# Patient Record
Sex: FEMALE | Race: BLACK OR AFRICAN AMERICAN | Employment: FULL TIME | URBAN - METROPOLITAN AREA
[De-identification: names, ages, dates, MRNs, and addresses within clinical notes are randomized per-mention and may not be internally consistent; named-entity substitution may affect disease eponyms.]

---

## 2017-02-13 RX ORDER — MEFENAMIC ACID 250 MG/1
CAPSULE ORAL
Qty: 30 CAP | Refills: 0 | Status: SHIPPED | OUTPATIENT
Start: 2017-02-13

## 2017-02-13 NOTE — TELEPHONE ENCOUNTER
Patient advised of prescription sent by MD and recommendations to schedule her AE and mammogram due after 6/14/16 Patient verbalized understanding and will call back to schedule appointments.

## 2017-02-13 NOTE — TELEPHONE ENCOUNTER
42year old patient last seen in the office on 6/14/16 AE. Patient requesting refill on Ponstel.  Patient seen on 9/30/2016 for problem visit  Please advise

## 2017-06-19 ENCOUNTER — APPOINTMENT (OUTPATIENT)
Dept: MAMMOGRAPHY | Age: 42
End: 2017-06-19

## 2017-06-19 ENCOUNTER — OFFICE VISIT (OUTPATIENT)
Dept: OBGYN CLINIC | Age: 42
End: 2017-06-19

## 2017-06-19 VITALS
RESPIRATION RATE: 18 BRPM | WEIGHT: 241.4 LBS | SYSTOLIC BLOOD PRESSURE: 126 MMHG | BODY MASS INDEX: 37.89 KG/M2 | HEIGHT: 67 IN | DIASTOLIC BLOOD PRESSURE: 68 MMHG

## 2017-06-19 DIAGNOSIS — Z01.419 WELL WOMAN EXAM WITH ROUTINE GYNECOLOGICAL EXAM: Primary | ICD-10-CM

## 2017-06-19 DIAGNOSIS — Z12.31 ENCOUNTER FOR SCREENING MAMMOGRAM FOR MALIGNANT NEOPLASM OF BREAST: ICD-10-CM

## 2017-06-19 DIAGNOSIS — R87.618 PAP SMEAR ABNORMALITY OF CERVIX/HUMAN PAPILLOMAVIRUS (HPV) POSITIVE: ICD-10-CM

## 2017-06-19 DIAGNOSIS — Z11.51 SCREENING FOR HUMAN PAPILLOMAVIRUS: ICD-10-CM

## 2017-06-19 RX ORDER — CLOTRIMAZOLE AND BETAMETHASONE DIPROPIONATE 10; .64 MG/G; MG/G
CREAM TOPICAL
Refills: 0 | COMMUNITY
Start: 2017-06-06

## 2017-06-19 NOTE — PATIENT INSTRUCTIONS

## 2017-06-19 NOTE — MR AVS SNAPSHOT
Visit Information Date & Time Provider Department Dept. Phone Encounter #  
 6/19/2017  1:40 PM MD Karlo Houser [phone] [id number] Your Appointments 6/19/2017  1:40 PM  
ESTABLISHED PATIENT with MD Karlo Houser ([address]) Appt Note: Mammo + AE w/TP - [employer]  
 [address] [facility] [address] [facility] [phone]  
[facility] 31 [address] Upcoming Health Maintenance Date Due INFLUENZA AGE 9 TO ADULT 8/1/2017 PAP AKA CERVICAL CYTOLOGY 6/14/2019 Allergies as of 6/19/2017  Review Complete On: 6/19/2017 By: Alex Smith No Known Allergies Current Immunizations  Never Reviewed No immunizations on file. Not reviewed this visit Vitals BP Resp Height(growth percentile) Weight(growth percentile) LMP BMI  
 126/68 (BP 1 Location: Left arm, BP Patient Position: Sitting) 18 5' 7\" (1.702 m) 241 lb 6.4 oz (109.5 kg) 05/29/2017 (Within Days) 37.81 kg/m2 OB Status Smoking Status Having regular periods Never Smoker BMI and BSA Data Body Mass Index Body Surface Area  
 37.81 kg/m 2 2.28 m 2 Preferred Pharmacy Pharmacy Name Phone [id number] [place], [place] - [address] [address] [phone] Your Updated Medication List  
  
   
This list is accurate as of: 6/19/17  1:28 PM.  Always use your most recent med list.  
  
  
  
  
 clotrimazole-betamethasone topical cream  
Commonly known as:  LOTRISONE  
APPLY TO AFFECTED AREA TWICE A DAY HYDROcodone-acetaminophen 5-325 mg per tablet Commonly known as:  Rozann Aurelio Take 1 Tab by mouth every four (4) hours as needed for Pain. IBUPROFEN PO Take  by mouth. mefenamic acid 250 mg capsule Commonly known as:  PONSTEL Take 500mg po as a first dose then 250 mg q6hrs as needed for pain for 7 days.  
  
 MIDOL PO Take  by mouth. naproxen 375 mg tablet Commonly known as:  NAPROSYN Take 1 Tab by mouth two (2) times daily (with meals). Patient Instructions Well Visit, Ages 25 to 48: Care Instructions Your Care Instructions Physical exams can help you stay healthy. Your doctor has checked your overall health and may have suggested ways to take good care of yourself. He or she also may have recommended tests. At home, you can help prevent illness with healthy eating, regular exercise, and other steps. Follow-up care is a key part of your treatment and safety. Be sure to make and go to all appointments, and call your doctor if you are having problems. It's also a good idea to know your test results and keep a list of the medicines you take. How can you care for yourself at home? · Reach and stay at a healthy weight. This will lower your risk for many problems, such as obesity, diabetes, heart disease, and high blood pressure. · Get at least 30 minutes of physical activity on most days of the week. Walking is a good choice. You also may want to do other activities, such as running, swimming, cycling, or playing tennis or team sports. Discuss any changes in your exercise program with your doctor. · Do not smoke or allow others to smoke around you. If you need help quitting, talk to your doctor about stop-smoking programs and medicines. These can increase your chances of quitting for good. · Talk to your doctor about whether you have any risk factors for sexually transmitted infections (STIs). Having one sex partner (who does not have STIs and does not have sex with anyone else) is a good way to avoid these infections. · Use birth control if you do not want to have children at this time. Talk with your doctor about the choices available and what might be best for you. · Protect your skin from too much sun. When you're outdoors from 10 a.m. to 4 p.m., stay in the shade or cover up with clothing and a hat with a wide brim. Wear sunglasses that block UV rays. Even when it's cloudy, put broad-spectrum sunscreen (SPF 30 or higher) on any exposed skin. · See a dentist one or two times a year for checkups and to have your teeth cleaned. · Wear a seat belt in the car. · Drink alcohol in moderation, if at all. That means no more than 2 drinks a day for men and 1 drink a day for women. Follow your doctor's advice about when to have certain tests. These tests can spot problems early. For everyone · Cholesterol. Have the fat (cholesterol) in your blood tested after age 21. Your doctor will tell you how often to have this done based on your age, family history, or other things that can increase your risk for heart disease. · Blood pressure. Have your blood pressure checked during a routine doctor visit. Your doctor will tell you how often to check your blood pressure based on your age, your blood pressure results, and other factors. · Vision. Talk with your doctor about how often to have a glaucoma test. 
· Diabetes. Ask your doctor whether you should have tests for diabetes. · Colon cancer. Have a test for colon cancer at age 48. You may have one of several tests. If you are younger than 48, you may need a test earlier if you have any risk factors. Risk factors include whether you already had a precancerous polyp removed from your colon or whether your parent, brother, sister, or child has had colon cancer. For women · Breast exam and mammogram. Talk to your doctor about when you should have a clinical breast exam and a mammogram. Medical experts differ on whether and how often women under 50 should have these tests. Your doctor can help you decide what is right for you. · Pap test and pelvic exam. Begin Pap tests at age 24. A Pap test is the best way to find cervical cancer.  The test often is part of a pelvic exam. Ask how often to have this test. 
 · Tests for sexually transmitted infections (STIs). Ask whether you should have tests for STIs. You may be at risk if you have sex with more than one person, especially if your partners do not wear condoms. For men · Tests for sexually transmitted infections (STIs). Ask whether you should have tests for STIs. You may be at risk if you have sex with more than one person, especially if you do not wear a condom. · Testicular cancer exam. Ask your doctor whether you should check your testicles regularly. · Prostate exam. Talk to your doctor about whether you should have a blood test (called a PSA test) for prostate cancer. Experts differ on whether and when men should have this test. Some experts suggest it if you are older than 39 and are -American or have a father or brother who got prostate cancer when he was younger than 72. When should you call for help? Watch closely for changes in your health, and be sure to contact your doctor if you have any problems or symptoms that concern you. Where can you learn more? Go to http://odalys-kandice.info/. Enter P072 in the search box to learn more about \"Well Visit, Ages 25 to 48: Care Instructions. \" Current as of: July 19, 2016 Content Version: 11.2 © 4459-3408 [employer], [employer]. Care instructions adapted under license by [employer] (which disclaims liability or warranty for this information). If you have questions about a medical condition or this instruction, always ask your healthcare professional. [place] [address] any warranty or liability for your use of this information. Introducing [facility]! Dear Shay Duong: Thank you for requesting a [employer] account. Our records indicate that you already have an active [employer] account. You can access your account anytime at [email or web address] Did you know that you can access your hospital and ER discharge instructions at any time in Sov[employer]? You can also review all of your test results from your hospital stay or ER visit. Additional Information If you have questions, please visit the Frequently Asked Questions section of the [employer] website at [email or web address] Remember, [employer] is NOT to be used for urgent needs. For medical emergencies, dial 911. Now available from your iPhone and Android! Please provide this summary of care documentation to your next provider. Your primary care clinician is listed as Lucretia Singh. If you have any questions after today's visit, please call [phone].

## 2017-06-19 NOTE — PROGRESS NOTES
[address] [facility]  [email or web address]  [phone]    Anita Romero MD, [address] [facility]       Annual Gynecologic Exam  WWE 52-38  Chief Complaint   Patient presents with    Well Woman       Lupillo Askew is a 42 y.o.  [address]  female who presents for an annual exam.  Patient's last menstrual period was 2017 (within days). .    She does not report additional concerns today. Menstrual status:  Her periods are moderate. She does not report dysmenorrhea/painful menses. She does not report irregular bleeding. Sexual history and Contraception:  History   Sexual Activity    Sexual activity: Yes    Partners: Male    Birth control/ protection: Condom     She always uses condoms with sexual activity. She does not reports new sexual partner(s) in the last year. The patient does not request STD testing. Preventive Medicine History:  Her last annual GYN exam was about one year ago. Her most recent Pap smear result: normal was obtained in 2016. Her most recent HR HPV screen was Positive obtained 1 year(s) ago. She does not know have a history of DAWN 2, 3 or cervical cancer. Breast health:  Last mammogram: was normal.   A mammogram was scheduled for today. Breast cancer family updated: see FH. Bone health: [place] [facility] She does not have a history of osteopenia/osteoporosis. Osteoporosis family history updated: see FH.      Past Medical History:   Diagnosis Date    Abnormal Pap smear of cervix 2016    negative, +HPV    Fibroids     H/O mammogram 2016    Negative    Migraine     Pap smear for cervical cancer screening 2010    negative, HPV negative     OB History    Para Term  AB SAB TAB Ectopic Multiple Living   3 3 1 2     1 4      # Outcome Date GA Lbr Davide/2nd Weight Sex Delivery Anes PTL Lv   3  11 32w2d  3 lb 14.4 oz (1.77 kg) F  LO GENERAL AN Y Y   2 Term 10/12/94   7 lb 15 oz (3.6 kg) M VACD EPI N Y   1A  92   3 lb 3 oz (1.446 kg) F  LO EPIDURAL AN Y Y   1B  92 33w0d  3 lb 9 oz (1.616 kg) F  LO EPI Y Y          Past Surgical History:   Procedure Laterality Date     DELIVERY ONLY      twins    DELIVERY   2011         HX COLPOSCOPY      HX GYN      C/S  for twins    HX GYN           HX OTHER SURGICAL      breast reduction     Family History   Problem Relation Age of Onset    No Known Problems Mother     Diabetes Father     Breast Cancer Maternal Aunt     Colon Cancer Other      great uncle    Breast Cancer Maternal Grandmother      Social History     Social History    Marital status:      Spouse name: N/A    Number of children: N/A    Years of education: N/A     Occupational History    Not on file. Social History Main Topics    Smoking status: Never Smoker    Smokeless tobacco: Never Used    Alcohol use No    Drug use: No    Sexual activity: Yes     Partners: Male     Birth control/ protection: Condom     Other Topics Concern    Not on file     Social History Narrative       No Known Allergies    Current Outpatient Prescriptions   Medication Sig    clotrimazole-betamethasone (LOTRISONE) topical cream APPLY TO AFFECTED AREA TWICE A DAY    mefenamic acid (PONSTEL) 250 mg capsule Take 500mg po as a first dose then 250 mg q6hrs as needed for pain for 7 days.  ACETAMINOPHEN/PAMABROM (MIDOL PO) Take  by mouth.  IBUPROFEN PO Take  by mouth.  naproxen (NAPROSYN) 375 mg tablet Take 1 Tab by mouth two (2) times daily (with meals).  HYDROcodone-acetaminophen (NORCO) 5-325 mg per tablet Take 1 Tab by mouth every four (4) hours as needed for Pain. No current facility-administered medications for this visit.         Patient Active Problem List   Diagnosis Code    ROM (rupture of membranes), premature O42.90       Review of Systems - History obtained from the patient  Constitutional: negative for weight loss, fever, night sweats  HEENT: negative for hearing loss, earache, congestion, snoring, sorethroat  CV: negative for chest pain, palpitations, edema  Resp: negative for cough, shortness of breath, wheezing  GI: negative for change in bowel habits, abdominal pain, black or bloody stools  : negative for frequency, dysuria, hematuria, vaginal discharge  MSK: negative for back pain, joint pain, muscle pain  Breast: negative for breast lumps, nipple discharge, galactorrhea  Skin :negative for itching, rash, hives  Neuro: negative for dizziness, headache, confusion, weakness  Psych: negative for anxiety, depression, change in mood  Heme/lymph: negative for bleeding, bruising, pallor    Physical Exam  Visit Vitals    /68 (BP 1 Location: Left arm, BP Patient Position: Sitting)    Resp 18    Ht 5' 7\" (1.702 m)    Wt 241 lb 6.4 oz (109.5 kg)    LMP 05/29/2017 (Within Days)    BMI 37.81 kg/m2       Constitutional  · Appearance: well-nourished, well developed, alert, in no acute distress    HENT  · Head and Face: appears normal    Neck  · Inspection/Palpation: normal appearance, no masses or tenderness  · Lymph Nodes: no lymphadenopathy present  · Thyroid: gland size normal, nontender, no nodules or masses present on palpation    Chest  · Respiratory Effort: breathing labored  · Auscultation: normal breath sounds    Cardiovascular  · Heart:  · Auscultation: regular rate and rhythm without murmur    Breasts  · Inspection of Breasts: breasts symmetrical, no skin changes, no discharge present, nipple appearance normal, no skin retraction present  · Palpation of Breasts and Axillae: no masses present on palpation, no breast tenderness  · Axillary Lymph Nodes: no lymphadenopathy present    Gastrointestinal  · Abdominal Examination: abdomen non-tender to palpation, normal bowel sounds, no masses present  · Liver and spleen: no hepatomegaly present, spleen not palpable  · Hernias: no hernias identified    Genitourinary  · External Genitalia: normal appearance for age, no discharge present, no tenderness present, no inflammatory lesions present, no masses present, no atrophy present  · Vagina: normal vaginal vault without central or paravaginal defects, no discharge present, no inflammatory lesions present, no masses present  · Bladder: non-tender to palpation  · Urethra: appears normal  · Cervix: normal , anterior  · Uterus: normal size, shape and consistency, RV  · Adnexa: no adnexal tenderness present, no adnexal masses present  · Perineum: perineum within normal limits, no evidence of trauma, no rashes or skin lesions present  · Anus: anus within normal limits, no hemorrhoids present  · Inguinal Lymph Nodes: no lymphadenopathy present    Skin  · General Inspection: no rash, no lesions identified    Neurologic/Psychiatric  · Mental Status:  · Orientation: grossly oriented to person, place and time  · Mood and Affect: mood normal, affect appropriate    Assessment:  42 y.o.  for well woman exam  Encounter Diagnoses   Name Primary?     Pap smear abnormality of cervix/human papillomavirus (HPV) positive     Screening for human papillomavirus     Well woman exam with routine gynecological exam Yes       Plan:  The patient was counseled about diet, exercise, healthy lifestyle  We discussed current self breast exam and mammogram recommendations  We discussed current pap smear and HR HPV testing guidelines  We recommend follow up in one year for routine annual gynecologic exam or sooner if needed  We recommend follow up with a primary care physician for any chronic medical problems or non-gynecologic concerns    We discussed calcium/vitamin D/weight bearing exercise and osteoporosis prevention  Handouts were given to the patient     Folllow up:  [x] return for annual well woman exam in one year or sooner if she is having problems  [] follow up and ultrasound  [x] mammogram  [] 6 months  [] 3 months  [] 1 month    Orders Placed This Encounter    PAP IG, HPV AND RFX HPV 51/17,27([id number])       Results for orders placed or performed in visit on 06/19/17   [facility] MAMMO BI SCREENING INCL CAD    Narrative    STUDY: Bilateral digital screening mammogram    INDICATION:  Screening. COMPARISON:  2016    BREAST COMPOSITION:  There are scattered areas of fibroglandular density. FINDINGS: Bilateral digital screening mammography was performed, and is  interpreted in conjunction with a computer assisted detection (CAD) system. There is a focal asymmetry within the posterior third of the right breast,  medial on the right CC view only. No new masses or suspicious calcifications  are identified in the left breast.       Impression    IMPRESSION:     1. BIRADS 0:  Incomplete: Needs additional imaging evaluation. Right breast  focal asymmetry. Additional evaluation is indicated. Spot compression views,  ML view, and possible ultrasound are recommended for further evaluation. 2. No mammographic evidence of malignancy, left breast.     The patient will be contacted for this, and a supplemental report will follow.

## 2017-06-21 ENCOUNTER — TELEPHONE (OUTPATIENT)
Dept: OBGYN CLINIC | Age: 42
End: 2017-06-21

## 2017-06-21 DIAGNOSIS — R92.8 ABNORMALITY OF RIGHT BREAST ON SCREENING MAMMOGRAM: Primary | ICD-10-CM

## 2017-06-21 NOTE — TELEPHONE ENCOUNTER
@ 10:18: This writer called patient in regards to her mammogram, needs additional views. Informed patient that her mammogram that was completed on June 19, 2017 showed right breast abnormality (focal asymmetry). Provided patient with  number and informed patient that someone will call her within 24 hours. Verbalized understanding at this time.

## 2017-06-23 ENCOUNTER — HOSPITAL ENCOUNTER (OUTPATIENT)
Dept: MAMMOGRAPHY | Age: 42
Discharge: HOME | End: 2017-06-23
Attending: OBSTETRICS & GYNECOLOGY
Payer: COMMERCIAL

## 2017-06-23 ENCOUNTER — HOSPITAL ENCOUNTER (OUTPATIENT)
Dept: ULTRASOUND IMAGING | Age: 42
Discharge: HOME | End: 2017-06-23
Attending: OBSTETRICS & GYNECOLOGY
Payer: COMMERCIAL

## 2017-06-23 DIAGNOSIS — R92.8 ABNORMALITY OF RIGHT BREAST ON SCREENING MAMMOGRAM: ICD-10-CM

## 2017-06-23 LAB
CYTOLOGIST CVX/VAG CYTO: ABNORMAL
CYTOLOGY CVX/VAG DOC THIN PREP: ABNORMAL
CYTOLOGY HISTORY:: ABNORMAL
DX ICD CODE: ABNORMAL
HPV I/H RISK 1 DNA CVX QL PROBE+SIG AMP: POSITIVE
HPV16 DNA CVX QL PROBE+SIG AMP: NEGATIVE
HPV18 DNA CVX QL PROBE+SIG AMP: NEGATIVE
Lab: ABNORMAL
OTHER STN SPEC: ABNORMAL
PATH REPORT.FINAL DX SPEC: ABNORMAL
STAT OF ADQ CVX/VAG CYTO-IMP: ABNORMAL

## 2017-06-23 PROCEDURE — 77065 DX MAMMO INCL CAD UNI: CPT

## 2017-06-25 NOTE — PROGRESS NOTES
Pap smear abnormal, recommend colposcopy. (HPV x 2 years)  Update FS/pap. Notify patient, tickle for follow-up. Premedicate with 600mg Ibuprofen, if no contraindication (for example: pregnancy/allergy).

## 2017-06-29 NOTE — PROGRESS NOTES
FS updated  Patient was notified of results and MD recommendations by [employer] kelly and was Read by Tez Ferro at 6/27/2017  4:21 PM. Patient is schedule for a colposcopy on 8/7/17 at 10:30am with Dr. Farnaz Chavira

## 2017-08-07 ENCOUNTER — OFFICE VISIT (OUTPATIENT)
Dept: OBGYN CLINIC | Age: 42
End: 2017-08-07

## 2017-08-07 ENCOUNTER — HOSPITAL ENCOUNTER (OUTPATIENT)
Dept: LAB | Age: 42
Discharge: HOME | End: 2017-08-07

## 2017-08-07 VITALS
DIASTOLIC BLOOD PRESSURE: 80 MMHG | HEIGHT: 67 IN | WEIGHT: 238 LBS | BODY MASS INDEX: 37.35 KG/M2 | SYSTOLIC BLOOD PRESSURE: 116 MMHG

## 2017-08-07 DIAGNOSIS — R87.810 CERVICAL HIGH RISK HPV (HUMAN PAPILLOMAVIRUS) TEST POSITIVE: Primary | ICD-10-CM

## 2017-08-07 DIAGNOSIS — Z01.812 PRE-PROCEDURE LAB EXAM: ICD-10-CM

## 2017-08-07 LAB
HCG URINE, QL. (POC): NEGATIVE
VALID INTERNAL CONTROL?: YES

## 2017-08-07 NOTE — PROGRESS NOTES
[facility] OB-GYN  [email or web address]  [phone]    Shaista Angelo MD, [address] [facility]       Ob/Gyn visit    Chief Complaint:   Chief Complaint   Patient presents with    Colposcopy       History of Present Illness: This is a new problem being evaluated by this provider. Jamari Ireland is a , 42 y.o. female [address]   She presents for an appointment for a pap smear abnormality consisting of normal and HPV positive in 2016, and the year prior. Cervical cancer risk assessment:  Number of lifetime sexual partners: 11  Approximate age of initiation of sexual intercourse: 11years old  Tobacco use history: never  Current tobacco use: No  HPV vaccine history: No  Sexually transmitted disease exposure: Yes, HPV    Currently taking PNV/folic acid: YES    LMP: Patient's last menstrual period was 2017 (within days).     PFSH:  Past Medical History:   Diagnosis Date    Abnormal mammogram of right breast 2017 dx mammo of rt breast normal    Abnormal Pap smear of cervix 2016, 17    negative, +HPV; negative, +HPV    Fibroids     H/O mammogram 2016    Negative    Migraine     Pap smear for cervical cancer screening 2010    negative, HPV negative     Past Surgical History:   Procedure Laterality Date     DELIVERY ONLY      twins    DELIVERY   2011         HX BREAST REDUCTION Bilateral     HX COLPOSCOPY      HX GYN      C/S  for twins    HX GYN           HX OTHER SURGICAL      breast reduction     Family History   Problem Relation Age of Onset    No Known Problems Mother     Diabetes Father     Breast Cancer Maternal Aunt     Colon Cancer Other      great uncle    Breast Cancer Maternal Grandmother      Social History     Social History    Marital status:      Spouse name: N/A    Number of children: N/A    Years of education: N/A     Occupational History    Not on file. Social History Main Topics    Smoking status: Never Smoker    Smokeless tobacco: Never Used    Alcohol use No    Drug use: No    Sexual activity: Yes     Partners: Male     Birth control/ protection: Condom     Other Topics Concern    Not on file     Social History Narrative       No Known Allergies  Current Outpatient Prescriptions   Medication Sig    clotrimazole-betamethasone (LOTRISONE) topical cream APPLY TO AFFECTED AREA TWICE A DAY    mefenamic acid (PONSTEL) 250 mg capsule Take 500mg po as a first dose then 250 mg q6hrs as needed for pain for 7 days.  ACETAMINOPHEN/PAMABROM (MIDOL PO) Take  by mouth.  IBUPROFEN PO Take  by mouth.  naproxen (NAPROSYN) 375 mg tablet Take 1 Tab by mouth two (2) times daily (with meals).  HYDROcodone-acetaminophen (NORCO) 5-325 mg per tablet Take 1 Tab by mouth every four (4) hours as needed for Pain. No current facility-administered medications for this visit.         Review of Systems:  History obtained from the patient  Constitutional: negative for fevers, chills and weight loss  ENT ROS: negative for - hearing change, oral lesions or visual changes  Respiratory: negative for cough, wheezing or dyspnea on exertion  Cardiovascular: negative for chest pain, irregular heart beats, exertional chest pressure/discomfort  Gastrointestinal: negative for dysphagia, nausea and vomiting  Genito-Urinary ROS: no dysuria, trouble voiding, or hematuria  Inteument/breast: negative for rash, breast lump and nipple discharge  Musculoskeletal:negative for stiff joints, neck pain and muscle weakness  Endocrine ROS: negative for - breast changes, galactorrhea or temperature intolerance  Hematological and Lymphatic ROS: negative for - blood clots, bruising or swollen lymph nodes    Physical Exam:  Visit Vitals    /80    Ht 5' 7\" (1.702 m)    Wt 238 lb (108 kg)    BMI 37.28 kg/m2       GENERAL: alert, well appearing, and in no distress  HEAD: normocephalic, atraumatic. ABDOMEN: soft, nontender, nondistended, no masses or organomegaly   EGBUS: no lesions, no inflammation, no masses  VULVA: normal appearing vulva with no masses, tenderness or lesions  VAGINA: normal appearing vagina with normal color, no lesions, no discharge  CERVIX: normal appearing cervix without discharge or lesions, non tender  UTERUS: uterus is normal size, shape, consistency and nontender   ADNEXA: normal adnexa in size, nontender and no masses  NEURO: alert, oriented, normal speech    Assessment:  Encounter Diagnoses   Name Primary?  Cervical high risk HPV (human papillomavirus) test positive Yes    Comment: x2       Plan:  The patient is advised that she should contact the office if she does not note improvement or if symptoms recur  She should contact our office with any questions or concerns  She could keep her routine annual exam appointment. We reviewed her pap smear results and risk factors for cervical cancer. We discussed r/b/a of colposcopy and pt electec to proceed with procedure. After being presented with the risks, benefits and alternatives has she signed a consent for the procedure. She states that she understands the need for the procedure and has no further questions. She was informed that she may experience discomfort.       Procedure Note: Colposcopy  Colposcopy procedure note:  Chart reviewed for the following:   IAlexandra MD, have reviewed the History, Physical and updated the Allergic reactions for [employer] performed immediately prior to start of procedure:   Shanita Mka MD, have performed the following reviews on [facility] prior to the start of the procedure:            * Patient was identified by name and date of birth   * Agreement on procedure being performed was verified  * Risks and Benefits explained to the patient  * Procedure site verified and marked as necessary  * Patient was positioned for comfort  * Consent was signed and verified  Time: 10:45am  Date of procedure: 8/7/2017    Procedure performed by: Shaista Angelo MD  Provider assisted by: Chapito Herman LPN  Patient assisted by: self  How tolerated by patient: tolerated the procedure well with no complications  Comments: none    She was positioned in the dorsal lithotomy position and a speculum was inserted into the vagina. Dilute acetic acid was applied to the cervix. The colposcope was used to visualize the cervix with white and green light. The transformation zone was completely visualized. This colposcopy was satisfactory. Findings:   The procedure was notable for white epithelium on the cervix. Biopsies were taken from the cervix . See accompanying image for biopsy sites. Monsels was applied to the cervix to obtain hemostasis. Endocervical currettage: An endocervical curettage was performed followed by a brush. Post Procedure Status: The patient tolerated the procedure well with minimal discomfort. She was observed for 10 minutes and released in good condition. She was given routine post-colposcopy instructions and handouts. She should notify us with any concerns, fevers or heavy bleeding. She was informed that she will be contacted with the pathology results and recommendation for follow-up.   Continue PNV/folic acid

## 2017-08-07 NOTE — PATIENT INSTRUCTIONS
Colposcopy: What to Expect at [address] [place] [address] may feel some soreness in your vagina for a day or two if you had a biopsy. Some vaginal bleeding or discharge is normal for up to a week after a biopsy. The discharge may be dark-colored if a solution was put on your cervix. You can use a sanitary pad for the bleeding. It may take a week or two for you to get the test results. This care sheet gives you a general idea about how long it will take for you to recover. But each person recovers at a different pace. Follow the steps below to feel better as quickly as possible. How can you care for yourself at home? Activity  · You can return to work and most daily activities right after the test.  Exercise  · Do not exercise for 1 day after the test.  Medicines  · Your doctor will tell you if and when you can restart your medicines. He or she will also give you instructions about taking any new medicines. · If you take blood thinners, such as warfarin (Coumadin), clopidogrel (Plavix), or aspirin, be sure to talk to your doctor. He or she will tell you if and when to start taking those medicines again. Make sure that you understand exactly what your doctor wants you to do. · Take an over-the-counter pain medicine, such as acetaminophen (Tylenol), ibuprofen (Advil, Motrin), or naproxen (Aleve). Be safe with medicines. Read and follow all instructions on the label. Do not take two or more pain medicines at the same time unless the doctor told you to. Many pain medicines have acetaminophen, which is Tylenol. Too much acetaminophen (Tylenol) can be harmful. Other instructions  · Use a pad if you have some bleeding. · Do not douche, have sexual intercourse, or use tampons for 1 week if you had a biopsy. This will allow time for your cervix to heal.  · You can take a bath or shower anytime after the test.  Follow-up care is a key part of your treatment and safety.  Be sure to make and go to all appointments, and call your doctor if you are having problems. It's also a good idea to know your test results and keep a list of the medicines you take. When should you call for help? Call your doctor now or seek immediate medical care if:  · You have severe vaginal bleeding. This means that you are soaking through your usual pads or tampons each hour for 2 or more hours. · You have pain that does not get better after you take pain medicine. · You have signs of infection, such as:  ¨ Increased pain. ¨ Bad-smelling vaginal discharge. ¨ A fever. Watch closely for any changes in your health, and be sure to contact your doctor if:  · You have questions or concerns. Where can you learn more? Go to http://odalys-kandice.info/. Enter M523 in the search box to learn more about \"Colposcopy: What to Expect at Home. \"  Current as of: May 3, 2017  Content Version: 11.3  © 7304-5406 [employer]@ [employer]. Care instructions adapted under license by [employer] (which disclaims liability or warranty for this information). If you have questions about a medical condition or this instruction, always ask your healthcare professional. [place] [address] any warranty or liability for your use of this information.

## 2017-08-12 NOTE — PROGRESS NOTES
Pathology : see report     1.  Endocervix, curetting:benign    2.  Ecto     Mild chronic cervicitis  Transformation zone is not present for evaluation  Please notify patient.   Update FS per pathology protocol 1/16  Tickle pap hpv 1 year

## 2020-08-07 ENCOUNTER — TELEPHONE (OUTPATIENT)
Dept: OBGYN CLINIC | Age: 45
End: 2020-08-07

## 2020-10-06 ENCOUNTER — TELEPHONE (OUTPATIENT)
Dept: OBGYN CLINIC | Age: 45
End: 2020-10-06

## 2020-10-07 NOTE — TELEPHONE ENCOUNTER
After multiple attempts were made to reach patient for follow up from colpo and abnormal pap smear---per Dr. Alfred Alfonso ok to terminate from practice.

## 2020-10-07 NOTE — TELEPHONE ENCOUNTER
----- Message from Nena Jackson MD sent at 10/5/2020  8:18 PM EDT -----  Regarding: FW: tickler- abnl pap  Send termination letter if overdue for colpo/abnl pap    trp  ----- Message -----  From: Nikki Segovia LPN  Sent: 23/3/6721   7:44 PM EDT  To: Nena Jackson MD  Subject: FW: tickler- abnl pap                            This tickler is 1 yrs old. 2 letters, a phone call and a mychart message was sent to patient. OK to file tickler?  ----- Message -----  From: Austen Sellers LPN  Sent: 7/17/7641  10:44 AM EDT  To: Froylan Stanford  Subject: FW: tickler- abnl pap                                ----- Message -----  From: John Rodriguez LPN  Sent: 87/91/3971   3:18 PM EDT  To: Zehra Quintana LPN  Subject: FW: tickler- abnl pap                            No contact made despite mychart message sent. Letter to contact office sent.      ----- Message -----  From: Dorann Nageotte  Sent: 4/3/2019   1:42 PM EST  To: Kelle Garrison LPN  Subject: FW: tickler- abnl pap                            To date no appt in [place] or contact since Colposcopy 8-2017. Repeat Pap past due 8-2018. MyChart message left for pt to make AE with repeat Pap.  ----- Message -----  From: Anirudh Lynn  Sent: 9/25/2018   8:52 AM  To: Zehra Quintana LPN  Subject: FW: tickler- abnl pap                                ----- Message -----     From: April Ko     Sent: 8/31/2018   6:35 PM       To: Vidhya Burnett LPN  Subject: FW: tickler- abnl pap                            No appt scheduled. Sent overdue letter  ----- Message -----     From: Jayla Beach LPN     Sent: 2/27/2871   4:29 PM       To: Vidhya Burnett LPN  Subject: FW: tickler- abnl pap                                ----- Message -----     From: April Ko     Sent: 8/29/2017   4:08 PM       To:  Jayla Beach LPN  Subject: FW: tickler- abnl pap                            8/2017 Neg colpo; RP in 1 year  ----- Message ----- From: Krystal Connell     Sent: 2/6/2017  12:51 PM       To: Monalisa Lofton LPN  Subject: FW: brooks stacy pap                                ----- Message -----     From: Shantel Nunez LPN     Sent: 4/08/2711   8:49 AM       To: Shantel Nunez LPN  Subject: brooks stacy pap                                HR HPV positive on pap smear. Advise repeat pap and HR HPV 1 year. James.

## 2020-10-14 ENCOUNTER — TELEPHONE (OUTPATIENT)
Dept: OBGYN CLINIC | Age: 45
End: 2020-10-14

## 2020-10-14 NOTE — TELEPHONE ENCOUNTER
MD Ko Schaffer April M, LPN; Oumar Santana termination letter if overdue for colpo/abnl pap         Termination letter put on [facility] desk.

## 2020-10-14 NOTE — TELEPHONE ENCOUNTER
----- Message from April JAZMIN Connell LPN sent at 91/9/2212  7:45 PM EDT -----  Regarding: FW: tickler- abnl pap  This is 1 yrs old. Sent to Dr. Vladislav Robertson asking if can be filed. Multiple attempts were made.  ----- Message -----  From: Bo Espino LPN  Sent: 3/86/6541  10:44 AM EDT  To: Patria Gupta  Subject: FW: tickler- abnl pap                                ----- Message -----  From: Georgia Friend LPN  Sent: 83/73/9461   3:18 PM EDT  To: Faustino Montez LPN  Subject: FW: tickler- abnl pap                            No contact made despite [employer] message sent. Letter to contact office sent.      ----- Message -----  From: Carlos Anton  Sent: 4/3/2019   1:42 PM EST  To: Rachel Garrison LPN  Subject: FW: tickler- abnl pap                            To date no appt in [facility] or contact since Colposcopy 8-2017. Repeat Pap past due 8-2018. MyChart message left for pt to make AE with repeat Pap.  ----- Message -----  From: Tricia Mckenna  Sent: 9/25/2018   8:52 AM  To: Faustino Montez LPN  Subject: FW: tickler- abnl pap                                ----- Message -----     From: April Ko     Sent: 8/31/2018   6:35 PM       To: Rosmery Martin LPN  Subject: FW: tickler- abnl pap                            No appt scheduled. Sent overdue letter  ----- Message -----     From: Joaquim Vasquez LPN     Sent: 3/85/7870   4:29 PM       To: Rosmery Martin LPN  Subject: FW: tickler- abnl pap                                ----- Message -----     From: April Ko     Sent: 8/29/2017   4:08 PM       To: Joaquim Vasquez LPN  Subject: FW: tickler- abnl pap                            8/2017 Neg colpo; RP in 1 year  ----- Message -----     From: Krystal Connell     Sent: 2/6/2017  12:51 PM       To:  Joaquim Vasquez LPN  Subject: FW: dahiana- tawanna pap                                ----- Message -----     From: Analia Veloz LPN     Sent: 9/41/0057   8:49 AM       To: Perla Esters, LPN  Subject: tickler- abnl pap                                HR HPV positive on pap smear. Advise repeat pap and HR HPV 1 year. Tickle.